# Patient Record
Sex: FEMALE
[De-identification: names, ages, dates, MRNs, and addresses within clinical notes are randomized per-mention and may not be internally consistent; named-entity substitution may affect disease eponyms.]

---

## 2018-05-18 ENCOUNTER — RX ONLY (OUTPATIENT)
Age: 78
Setting detail: RX ONLY
End: 2018-05-18

## 2018-05-18 ENCOUNTER — APPOINTMENT (RX ONLY)
Dept: URBAN - NONMETROPOLITAN AREA CLINIC 22 | Facility: CLINIC | Age: 78
Setting detail: DERMATOLOGY
End: 2018-05-18

## 2018-05-18 DIAGNOSIS — L13.9 BULLOUS DISORDER, UNSPECIFIED: ICD-10-CM

## 2018-05-18 DIAGNOSIS — L08.9 LOCAL INFECTION OF THE SKIN AND SUBCUTANEOUS TISSUE, UNSPECIFIED: ICD-10-CM

## 2018-05-18 PROBLEM — I63.50 CEREBRAL INFARCTION DUE TO UNSPECIFIED OCCLUSION OR STENOSIS OF UNSPECIFIED CEREBRAL ARTERY: Status: ACTIVE | Noted: 2018-05-18

## 2018-05-18 PROBLEM — L30.9 DERMATITIS, UNSPECIFIED: Status: ACTIVE | Noted: 2018-05-18

## 2018-05-18 PROCEDURE — 99203 OFFICE O/P NEW LOW 30 MIN: CPT

## 2018-05-18 PROCEDURE — ? TREATMENT REGIMEN

## 2018-05-18 PROCEDURE — ? PRESCRIPTION

## 2018-05-18 PROCEDURE — ? ORDER TESTS

## 2018-05-18 PROCEDURE — ? COUNSELING

## 2018-05-18 RX ORDER — MEDICAL SUPPLY, MISCELLANEOUS
LIQUID (ML) MISCELLANEOUS
Qty: 150 | Refills: 3 | Status: ERX

## 2018-05-18 RX ORDER — MEDICAL SUPPLY, MISCELLANEOUS
LIQUID (ML) MISCELLANEOUS
Qty: 150 | Refills: 3 | Status: ERX | COMMUNITY
Start: 2018-05-18

## 2018-05-18 RX ORDER — PREDNISONE 10 MG/1
TABLET ORAL
Qty: 35 | Refills: 0 | Status: ERX

## 2018-05-18 RX ORDER — CIPROFLOXACIN 500 MG/1
500MG TABLET, FILM COATED ORAL BID
Qty: 20 | Refills: 0 | Status: ERX | COMMUNITY
Start: 2018-05-18

## 2018-05-18 RX ORDER — PREDNISONE 10 MG/1
10MG TABLET ORAL
Qty: 35 | Refills: 0 | Status: ERX

## 2018-05-18 RX ADMIN — CIPROFLOXACIN 500MG: 500 TABLET, FILM COATED ORAL at 00:00

## 2018-05-18 RX ADMIN — Medication: at 00:00

## 2018-05-18 ASSESSMENT — LOCATION SIMPLE DESCRIPTION DERM
LOCATION SIMPLE: RIGHT GREAT TOE
LOCATION SIMPLE: LEFT INFERIOR GINGIVAE
LOCATION SIMPLE: LEFT BUCCAL MUCOSA
LOCATION SIMPLE: RIGHT INFERIOR GINGIVAE
LOCATION SIMPLE: RIGHT SUPERIOR GINGIVAE
LOCATION SIMPLE: LEFT SUPERIOR GINGIVAE
LOCATION SIMPLE: HARD PALATE

## 2018-05-18 ASSESSMENT — LOCATION DETAILED DESCRIPTION DERM
LOCATION DETAILED: RIGHT HARD PALATE
LOCATION DETAILED: RIGHT INFERIOR GINGIVAE
LOCATION DETAILED: RIGHT SUPERIOR GINGIVAE
LOCATION DETAILED: RIGHT DISTAL PLANTAR GREAT TOE
LOCATION DETAILED: LEFT INFERIOR GINGIVAE
LOCATION DETAILED: LEFT HARD PALATE
LOCATION DETAILED: LEFT BUCCAL MUCOSA
LOCATION DETAILED: LEFT SUPERIOR GINGIVAE

## 2018-05-18 ASSESSMENT — BSA RASH: BSA RASH: 20

## 2018-05-18 ASSESSMENT — LOCATION ZONE DERM
LOCATION ZONE: TOE
LOCATION ZONE: MUCOUS_MEMBRANE

## 2018-05-18 ASSESSMENT — SEVERITY ASSESSMENT: SEVERITY: MILD

## 2018-05-18 NOTE — HPI: RASH (LICHEN PLANUS)
How Severe Is It?: mild
Is This A New Presentation, Or A Follow-Up?: Rash
Additional History: Patient states that she was diagnosed with Lichen Planus 2 years ago by [facility] in [place]. Patient states that she is currently not on any treatments. She states that she was previously given Clobetasol ointment which helps but, she had been out and would like a refill today. Patient states that she is currently seeing wound care for the open wounds on her feet.

## 2018-05-18 NOTE — PROCEDURE: TREATMENT REGIMEN
Plan: Discussed diagnosis and treatment options with patient and her daughter at this time. Informed patient that treatment with an oral immunosuppressant can be started to help reduce her symptoms. informed patient that her previous medical records will need to be obtained prior to initiation of  any treatment to verify diagnosis. informed patient that she can be given an oral steroid to help minimize her discomfort at this time. Record release was signed by the patient today to forward records to our office from [facility]. Patient is agreeable to treatment plan as discussed today
Initiate Treatment: - Ciprofloxacin 500 mg tablet : Take one pill two times a day for 10 days.
Detail Level: Zone
Initiate Treatment: - prednisone 10 mg tablet : Take 5 PO X 2days then 4 PO X 2days then 3 PO X 2days then 2 PO X 2days then 1 PO x 7 Days then stop\\n- Magic Mouthwash Sig: Swish and spit 5ml three times daily as needed. Shake well before using.
Plan: Discussed diagnosis and treatment with patient today. Informed patient that this site will be cultured and she will be started on an antibiotic for infection. Patient voiced understanding

## 2018-06-04 ENCOUNTER — RX ONLY (OUTPATIENT)
Age: 78
Setting detail: RX ONLY
End: 2018-06-04

## 2018-06-04 ENCOUNTER — APPOINTMENT (RX ONLY)
Dept: URBAN - NONMETROPOLITAN AREA CLINIC 22 | Facility: CLINIC | Age: 78
Setting detail: DERMATOLOGY
End: 2018-06-04

## 2018-06-04 DIAGNOSIS — L13.9 BULLOUS DISORDER, UNSPECIFIED: ICD-10-CM

## 2018-06-04 DIAGNOSIS — G89.3 NEOPLASM RELATED PAIN (ACUTE) (CHRONIC): ICD-10-CM

## 2018-06-04 PROBLEM — L30.9 DERMATITIS, UNSPECIFIED: Status: ACTIVE | Noted: 2018-06-04

## 2018-06-04 PROBLEM — I10 ESSENTIAL (PRIMARY) HYPERTENSION: Status: ACTIVE | Noted: 2018-06-04

## 2018-06-04 PROCEDURE — ? PRESCRIPTION

## 2018-06-04 PROCEDURE — ? TREATMENT REGIMEN

## 2018-06-04 PROCEDURE — ? COUNSELING

## 2018-06-04 PROCEDURE — ? BIOPSY BY SHAVE METHOD

## 2018-06-04 PROCEDURE — 99213 OFFICE O/P EST LOW 20 MIN: CPT | Mod: 25

## 2018-06-04 PROCEDURE — 11101: CPT

## 2018-06-04 PROCEDURE — 11100: CPT

## 2018-06-04 RX ORDER — TRAMADOL HYDROCHLORIDE 50 MG/1
50MG TABLET, FILM COATED ORAL
Qty: 20 | Refills: 0

## 2018-06-04 RX ORDER — PREDNISONE 10 MG/1
10MG TABLET ORAL
Qty: 35 | Refills: 0 | Status: ERX

## 2018-06-04 ASSESSMENT — LOCATION SIMPLE DESCRIPTION DERM
LOCATION SIMPLE: LEFT INFERIOR GINGIVAE
LOCATION SIMPLE: RIGHT SUPERIOR GINGIVAE
LOCATION SIMPLE: RIGHT LOWER LEG
LOCATION SIMPLE: RIGHT UPPER ARM
LOCATION SIMPLE: HARD PALATE
LOCATION SIMPLE: LEFT BUCCAL MUCOSA
LOCATION SIMPLE: LEFT SUPERIOR GINGIVAE
LOCATION SIMPLE: RIGHT INFERIOR GINGIVAE

## 2018-06-04 ASSESSMENT — LOCATION DETAILED DESCRIPTION DERM
LOCATION DETAILED: LEFT BUCCAL MUCOSA
LOCATION DETAILED: RIGHT LATERAL PROXIMAL UPPER ARM
LOCATION DETAILED: LEFT SUPERIOR GINGIVAE
LOCATION DETAILED: RIGHT LATERAL ANKLE
LOCATION DETAILED: RIGHT INFERIOR GINGIVAE
LOCATION DETAILED: LEFT HARD PALATE
LOCATION DETAILED: RIGHT SUPERIOR GINGIVAE
LOCATION DETAILED: LEFT INFERIOR GINGIVAE
LOCATION DETAILED: RIGHT HARD PALATE

## 2018-06-04 ASSESSMENT — LOCATION ZONE DERM
LOCATION ZONE: LEG
LOCATION ZONE: ARM
LOCATION ZONE: MUCOUS_MEMBRANE

## 2018-06-04 NOTE — PROCEDURE: BIOPSY BY SHAVE METHOD
Dressing: bandage
Notification Instructions: Patient will be notified of biopsy results. However, patient instructed to call the office if not contacted within 2 weeks.
Size Of Lesion In Cm: 0
Silver Nitrate Text: The wound bed was treated with silver nitrate after the biopsy was performed.
Electrodesiccation Text: The wound bed was treated with electrodesiccation after the biopsy was performed.
Post-Care Instructions: I reviewed with the patient in detail post-care instructions. Patient is to keep the biopsy site dry overnight, and then apply Vaseline twice daily until healed. Patient may apply hydrogen peroxide soaks to remove any crusting.
Billing Type: Third-Party Bill
Curettage Text: The wound bed was treated with curettage after the biopsy was performed.
Bill For Surgical Tray: no
Type Of Destruction Used: Curettage
Anesthesia Volume In Cc: 1
Cryotherapy Text: The wound bed was treated with cryotherapy after the biopsy was performed.
Anesthesia Type: 1% lidocaine with 1:100,000 epinephrine and a 1:10 solution of 8.4% sodium bicarbonate
Detail Level: Detailed
Render Post-Care Instructions In Note?: yes
Electrodesiccation And Curettage Text: The wound bed was treated with electrodesiccation and curettage after the biopsy was performed.
Wound Care: Vaseline
Hemostasis: Drysol
Biopsy Method: Personna blade
Consent: Written consent was obtained and risks were reviewed including but not limited to scarring, infection, bleeding, scabbing, incomplete removal, nerve damage and allergy to anesthesia.
Biopsy Type: H and E
Biopsy Type: DIF

## 2018-06-04 NOTE — PROCEDURE: TREATMENT REGIMEN
Plan: Discussed diagnosis and treatment options with patient and her daughter at this time. Informed patient that a biopsy is necessary at this time for a definitive diagnosis, as the information provided from [facility] was insufficient. Patient is agreeable to treatment plan as discussed today
Continue Regimen: - Magic Mouthwash Sig: Swish and spit 5ml three times daily as needed. Shake well before using
Initiate Treatment: ReStart:\\n- prednisone 10 mg tablet : Take 5 PO X 2days then 4 PO X 2days then 3 PO X 2days then 2 PO X 2days then 1 PO x 7 Days then stop
Detail Level: Zone

## 2018-06-13 ENCOUNTER — APPOINTMENT (RX ONLY)
Dept: URBAN - NONMETROPOLITAN AREA CLINIC 22 | Facility: CLINIC | Age: 78
Setting detail: DERMATOLOGY
End: 2018-06-13

## 2018-06-13 DIAGNOSIS — L93.2 OTHER LOCAL LUPUS ERYTHEMATOSUS: ICD-10-CM

## 2018-06-13 PROBLEM — F32.9 MAJOR DEPRESSIVE DISORDER, SINGLE EPISODE, UNSPECIFIED: Status: ACTIVE | Noted: 2018-06-13

## 2018-06-13 PROCEDURE — ? ORDER TESTS

## 2018-06-13 RX ORDER — TRAMADOL HYDROCHLORIDE 50 MG/1
50MG TABLET, FILM COATED ORAL
Qty: 20 | Refills: 0

## 2018-06-13 ASSESSMENT — SEVERITY ASSESSMENT: SEVERITY: MILD TO MODERATE

## 2018-06-20 ENCOUNTER — RX ONLY (OUTPATIENT)
Age: 78
Setting detail: RX ONLY
End: 2018-06-20

## 2018-06-20 RX ORDER — PREDNISONE 10 MG/1
10MG TABLET ORAL
Qty: 20 | Refills: 0 | Status: ERX

## 2018-07-03 ENCOUNTER — RX ONLY (OUTPATIENT)
Age: 78
Setting detail: RX ONLY
End: 2018-07-03

## 2018-07-03 RX ORDER — TRAMADOL HYDROCHLORIDE 50 MG/1
50 MG TABLET, FILM COATED ORAL
Qty: 20 | Refills: 0

## 2018-07-11 ENCOUNTER — APPOINTMENT (RX ONLY)
Dept: URBAN - NONMETROPOLITAN AREA CLINIC 22 | Facility: CLINIC | Age: 78
Setting detail: DERMATOLOGY
End: 2018-07-11

## 2018-07-11 DIAGNOSIS — Z79.899 OTHER LONG TERM (CURRENT) DRUG THERAPY: ICD-10-CM

## 2018-07-11 DIAGNOSIS — L93.2 OTHER LOCAL LUPUS ERYTHEMATOSUS: ICD-10-CM

## 2018-07-11 PROBLEM — E78.5 HYPERLIPIDEMIA, UNSPECIFIED: Status: ACTIVE | Noted: 2018-07-11

## 2018-07-11 PROBLEM — K21.9 GASTRO-ESOPHAGEAL REFLUX DISEASE WITHOUT ESOPHAGITIS: Status: ACTIVE | Noted: 2018-07-11

## 2018-07-11 PROCEDURE — ? COUNSELING

## 2018-07-11 PROCEDURE — ? TREATMENT REGIMEN

## 2018-07-11 PROCEDURE — ? PRESCRIPTION

## 2018-07-11 PROCEDURE — ? HIGH RISK MEDICATION MONITORING

## 2018-07-11 PROCEDURE — 99213 OFFICE O/P EST LOW 20 MIN: CPT

## 2018-07-11 PROCEDURE — ? REFERRAL

## 2018-07-11 PROCEDURE — ? ORDER TESTS

## 2018-07-11 RX ORDER — MYCOPHENOLATE MOFETIL 500 MG/1
500MG TABLET, FILM COATED ORAL
Qty: 120 | Refills: 0 | Status: ERX | COMMUNITY
Start: 2018-07-11

## 2018-07-11 RX ORDER — HYDROXYCHLOROQUINE SULFATE 200 MG/1
200MG TABLET ORAL BID
Qty: 60 | Refills: 3 | Status: ERX | COMMUNITY
Start: 2018-07-11

## 2018-07-11 RX ADMIN — MYCOPHENOLATE MOFETIL 500MG: 500 TABLET, FILM COATED ORAL at 00:00

## 2018-07-11 RX ADMIN — HYDROXYCHLOROQUINE SULFATE 200MG: 200 TABLET ORAL at 00:00

## 2018-07-11 ASSESSMENT — LOCATION SIMPLE DESCRIPTION DERM
LOCATION SIMPLE: RIGHT INFERIOR GINGIVAE
LOCATION SIMPLE: RIGHT FOOT
LOCATION SIMPLE: LEFT INFERIOR GINGIVAE
LOCATION SIMPLE: LEFT PLANTAR SURFACE
LOCATION SIMPLE: LEFT SUPERIOR GINGIVAE
LOCATION SIMPLE: RIGHT SUPERIOR GINGIVAE
LOCATION SIMPLE: LEFT BUCCAL MUCOSA
LOCATION SIMPLE: HARD PALATE
LOCATION SIMPLE: LEFT FOOT
LOCATION SIMPLE: RIGHT PLANTAR SURFACE

## 2018-07-11 ASSESSMENT — LOCATION DETAILED DESCRIPTION DERM
LOCATION DETAILED: RIGHT PLANTAR FOREFOOT OVERLYING 2ND METATARSAL
LOCATION DETAILED: RIGHT INFERIOR GINGIVAE
LOCATION DETAILED: LEFT DORSAL FOOT
LOCATION DETAILED: RIGHT HARD PALATE
LOCATION DETAILED: LEFT LATERAL MALLEOLUS
LOCATION DETAILED: LEFT HARD PALATE
LOCATION DETAILED: RIGHT LATERAL MALLEOLUS
LOCATION DETAILED: RIGHT SUPERIOR GINGIVAE
LOCATION DETAILED: LEFT BUCCAL MUCOSA
LOCATION DETAILED: LEFT SUPERIOR GINGIVAE
LOCATION DETAILED: LEFT PLANTAR FOREFOOT OVERLYING 2ND METATARSAL
LOCATION DETAILED: RIGHT DORSAL FOOT
LOCATION DETAILED: LEFT INFERIOR GINGIVAE
LOCATION DETAILED: LEFT MEDIAL PLANTAR HEEL
LOCATION DETAILED: RIGHT MEDIAL PLANTAR HEEL

## 2018-07-11 ASSESSMENT — LOCATION ZONE DERM
LOCATION ZONE: FEET
LOCATION ZONE: MUCOUS_MEMBRANE

## 2018-07-11 NOTE — PROCEDURE: TREATMENT REGIMEN
Detail Level: Zone
Plan: Discussed diagnosis and treatment options with patient and her daughter at this time. Informed patient that her results showed Bullous Lupus. Discussed that this condition is an autoimmune disorder in which her own immunity is attacking her body. Discussed treatment with Plaquenil and Cellcept. Informed patient that the dosage of Cellcept may need to be titrated and can be increased if she tolerates it well in 6-8 weeks. Discussed the risk and side effects of each medication with patient and her daughter today. Informed patient she will need to have lab work drawn every 2 weeks while she is starting on her treatment regimen. Informed patient that she will need to be seen with an ophthalmologist for a Plaquenil eye exam. All of the patient’s question were answered to her satisfaction. Patient is agreeable to treatment plan as discussed today
Initiate Treatment: - Plaquenil 200mg - 1 PO BID \\n- Cellcept 500mg - Take 2 PO BID\\n- Tylenol #3 - Quant. 20, no refills - Take 1 PO Every 6hours PRN Pain (called into patient’s pharmacy via message)
Continue Regimen: - Bleach Bath Soaks - 3tsp of Bleach in 1 gallon of water 3 times per week for 10minutes \\n- Magic Mouthwash Sig: Swish and spit 5ml three times daily as needed. Shake well before using

## 2018-07-25 ENCOUNTER — APPOINTMENT (RX ONLY)
Dept: URBAN - NONMETROPOLITAN AREA CLINIC 22 | Facility: CLINIC | Age: 78
Setting detail: DERMATOLOGY
End: 2018-07-25

## 2018-07-25 PROBLEM — M86.19 OTHER ACUTE OSTEOMYELITIS, MULTIPLE SITES: Status: ACTIVE | Noted: 2018-07-25

## 2018-07-25 PROCEDURE — ? ORDER MRI

## 2018-07-25 PROCEDURE — ? ORDER TESTS

## 2018-08-28 RX ORDER — MYCOPHENOLATE MOFETIL 500 MG/1
500MG TABLET, FILM COATED ORAL
Qty: 120 | Refills: 0 | Status: ERX

## 2018-08-29 RX ORDER — MYCOPHENOLATE MOFETIL 500 MG/1
500MG TABLET, FILM COATED ORAL
Qty: 60 | Refills: 0 | Status: ERX

## 2018-08-29 RX ORDER — MYCOPHENOLATE MOFETIL 500 MG/1
TABLET, FILM COATED ORAL
Qty: 60 | Refills: 0 | Status: ERX

## 2018-10-03 ENCOUNTER — RX ONLY (OUTPATIENT)
Age: 78
Setting detail: RX ONLY
End: 2018-10-03

## 2018-10-03 RX ORDER — MEDICAL SUPPLY, MISCELLANEOUS
LIQUID (ML) MISCELLANEOUS
Qty: 150 | Refills: 3 | Status: ERX

## 2018-10-31 ENCOUNTER — APPOINTMENT (RX ONLY)
Dept: URBAN - NONMETROPOLITAN AREA CLINIC 22 | Facility: CLINIC | Age: 78
Setting detail: DERMATOLOGY
End: 2018-10-31

## 2018-10-31 DIAGNOSIS — Z79.899 OTHER LONG TERM (CURRENT) DRUG THERAPY: ICD-10-CM

## 2018-10-31 DIAGNOSIS — L93.2 OTHER LOCAL LUPUS ERYTHEMATOSUS: ICD-10-CM | Status: IMPROVED

## 2018-10-31 PROBLEM — E03.9 HYPOTHYROIDISM, UNSPECIFIED: Status: ACTIVE | Noted: 2018-10-31

## 2018-10-31 PROCEDURE — ? HIGH RISK MEDICATION MONITORING

## 2018-10-31 PROCEDURE — 99213 OFFICE O/P EST LOW 20 MIN: CPT

## 2018-10-31 PROCEDURE — ? PRESCRIPTION

## 2018-10-31 PROCEDURE — ? TREATMENT REGIMEN

## 2018-10-31 PROCEDURE — ? COUNSELING

## 2018-10-31 RX ORDER — MYCOPHENOLATE MOFETIL 500 MG/1
500MG TABLET, FILM COATED ORAL
Qty: 120 | Refills: 3 | Status: ERX

## 2018-10-31 RX ORDER — HYDROXYCHLOROQUINE SULFATE 200 MG/1
200MG TABLET ORAL BID
Qty: 60 | Refills: 3 | Status: ERX

## 2018-10-31 ASSESSMENT — LOCATION DETAILED DESCRIPTION DERM
LOCATION DETAILED: RIGHT INFERIOR GINGIVAE
LOCATION DETAILED: RIGHT LATERAL MALLEOLUS
LOCATION DETAILED: LEFT PLANTAR FOREFOOT OVERLYING 2ND METATARSAL
LOCATION DETAILED: LEFT BUCCAL MUCOSA
LOCATION DETAILED: LEFT HARD PALATE
LOCATION DETAILED: LEFT SUPERIOR GINGIVAE
LOCATION DETAILED: LEFT MEDIAL PLANTAR HEEL
LOCATION DETAILED: RIGHT PLANTAR FOREFOOT OVERLYING 2ND METATARSAL
LOCATION DETAILED: LEFT INFERIOR GINGIVAE
LOCATION DETAILED: LEFT INFERIOR MEDIAL FOREHEAD
LOCATION DETAILED: RIGHT DORSAL FOOT
LOCATION DETAILED: RIGHT SUPERIOR GINGIVAE
LOCATION DETAILED: RIGHT MEDIAL PLANTAR HEEL
LOCATION DETAILED: RIGHT HARD PALATE
LOCATION DETAILED: LEFT LATERAL MALLEOLUS
LOCATION DETAILED: LEFT DORSAL FOOT

## 2018-10-31 ASSESSMENT — LOCATION SIMPLE DESCRIPTION DERM
LOCATION SIMPLE: LEFT SUPERIOR GINGIVAE
LOCATION SIMPLE: LEFT FOOT
LOCATION SIMPLE: RIGHT INFERIOR GINGIVAE
LOCATION SIMPLE: LEFT FOREHEAD
LOCATION SIMPLE: LEFT PLANTAR SURFACE
LOCATION SIMPLE: RIGHT FOOT
LOCATION SIMPLE: RIGHT SUPERIOR GINGIVAE
LOCATION SIMPLE: RIGHT PLANTAR SURFACE
LOCATION SIMPLE: LEFT INFERIOR GINGIVAE
LOCATION SIMPLE: HARD PALATE
LOCATION SIMPLE: LEFT BUCCAL MUCOSA

## 2018-10-31 ASSESSMENT — LOCATION ZONE DERM
LOCATION ZONE: FACE
LOCATION ZONE: MUCOUS_MEMBRANE
LOCATION ZONE: FEET

## 2018-10-31 NOTE — PROCEDURE: TREATMENT REGIMEN
Continue Regimen: - Plaquenil 200mg - 1 PO BID \\n- Cellcept 500mg - Take 2 PO BID\\n- Bleach Bath Soaks - 3tsp of Bleach in 1 gallon of water 3 times per week for 10minutes \\n- Magic Mouthwash Sig: Swish and spit 5ml three times daily as needed. Shake well before using
Plan: Discussed continuing the plaquenil and cellcept. Explained that we will continue to have lab drawn every month to ensure that her white blood cells count doesn’t drop too low. Patient is agreeable to treatment plan as discussed today. She states that she will have the blood work done the first week of every month. She agrees that she has noticed a decrease in new blisters since starting the medication.
Detail Level: Zone